# Patient Record
Sex: FEMALE | Race: WHITE
[De-identification: names, ages, dates, MRNs, and addresses within clinical notes are randomized per-mention and may not be internally consistent; named-entity substitution may affect disease eponyms.]

---

## 2019-02-27 ENCOUNTER — HOSPITAL ENCOUNTER (INPATIENT)
Dept: HOSPITAL 31 - C.EROB | Age: 29
LOS: 3 days | Discharge: HOME | End: 2019-03-02
Attending: OBSTETRICS & GYNECOLOGY | Admitting: OBSTETRICS & GYNECOLOGY
Payer: COMMERCIAL

## 2019-02-27 VITALS — BODY MASS INDEX: 25.9 KG/M2

## 2019-02-27 DIAGNOSIS — Z3A.39: ICD-10-CM

## 2019-02-27 LAB
ALBUMIN SERPL-MCNC: 3.8 G/DL (ref 3.5–5)
ALBUMIN/GLOB SERPL: 1.3 {RATIO} (ref 1–2.1)
ALT SERPL-CCNC: 20 U/L (ref 9–52)
AST SERPL-CCNC: 27 U/L (ref 14–36)
BASOPHILS # BLD AUTO: 0 K/UL (ref 0–0.2)
BASOPHILS NFR BLD: 0.4 % (ref 0–2)
BILIRUB UR-MCNC: NEGATIVE MG/DL
BUN SERPL-MCNC: 7 MG/DL (ref 7–17)
CALCIUM SERPL-MCNC: 9.3 MG/DL (ref 8.6–10.4)
EOSINOPHIL # BLD AUTO: 0.2 K/UL (ref 0–0.7)
EOSINOPHIL NFR BLD: 1.8 % (ref 0–4)
ERYTHROCYTE [DISTWIDTH] IN BLOOD BY AUTOMATED COUNT: 13.7 % (ref 11.5–14.5)
GFR NON-AFRICAN AMERICAN: > 60
GLUCOSE UR STRIP-MCNC: NORMAL MG/DL
HGB BLD-MCNC: 12.1 G/DL (ref 11–16)
LEUKOCYTE ESTERASE UR-ACNC: (no result) LEU/UL
LYMPHOCYTES # BLD AUTO: 1.4 K/UL (ref 1–4.3)
LYMPHOCYTES NFR BLD AUTO: 12.4 % (ref 20–40)
MCH RBC QN AUTO: 29.6 PG (ref 27–31)
MCHC RBC AUTO-ENTMCNC: 33 G/DL (ref 33–37)
MCV RBC AUTO: 89.7 FL (ref 81–99)
MONOCYTES # BLD: 0.9 K/UL (ref 0–0.8)
MONOCYTES NFR BLD: 7.4 % (ref 0–10)
NEUTROPHILS # BLD: 9 K/UL (ref 1.8–7)
NEUTROPHILS NFR BLD AUTO: 78 % (ref 50–75)
NRBC BLD AUTO-RTO: 0 % (ref 0–2)
PH UR STRIP: 6 [PH] (ref 5–8)
PLATELET # BLD: 269 K/UL (ref 130–400)
PMV BLD AUTO: 8.3 FL (ref 7.2–11.7)
PROT UR STRIP-MCNC: NEGATIVE MG/DL
RBC # BLD AUTO: 4.09 MIL/UL (ref 3.8–5.2)
RBC # UR STRIP: NEGATIVE /UL
SP GR UR STRIP: 1.02 (ref 1–1.03)
SQUAMOUS EPITHIAL: 1 /HPF (ref 0–5)
UROBILINOGEN UR-MCNC: NORMAL MG/DL (ref 0.2–1)
WBC # BLD AUTO: 11.6 K/UL (ref 4.8–10.8)

## 2019-02-27 NOTE — OBHP
===========================

Datetime: 2019 16:10

===========================

   

IP Adm Impression:  Term, intrauterine pregnancy; Ruptured Membranes

IP Admit Plan:  Admit to unit; Initiate labor protocol

Admit Comment, IP Provider:   at 39+weks vame with c/o srom at 2 pm and irr ctxs started ysterday
, no vb,+fm

   obhx pimi

   pmh de

   med pnv

   all nkda

   psh de

   soch de

      

   ssse =pooling+ntrazine

      

   a/p  at 39+ weeks prom/labor

   admit t l_d

   npo/ivf

   labs

   pitocin

   cont gisselle and efm

   anticipate 

Pelvic Type - PN:  Adequate

Extremities - PN:  Normal

Abdomen - PN:  Normal

Back - PN:  Normal

Breast - PN:  Normal

Lungs - PN:  Normal

Heart - PN:  Normal

Thyroid - PN:  Normal

Neurologic - PN:  Normal

HEENT - PN:  Normal

General - PN:  Normal

Amniotic Fluid Color, Provider:  Clear

Membranes, Provider:  Ruptured

Contraction Comments Provider:  q1-4

IP Prenatal Hx Assessment:  The Prenatal History has been Reviewed and is Current

EGA AdmitDate IP:  39.5

Vital Signs Provider:  Reviewed; Within Normal Limits

IP Chief Complaint:  Uterine contractions; Suspected ruptured membranes

Dilatation, Provider:  2

Effacement, Provider:  60

Station, Provider:  -2

Genitourinary Exam:  Normal

DTRs - PN:  Normal

## 2019-02-27 NOTE — OBADHP
===========================

Datetime: 2019 16:10

===========================

   

Admit Comment, IP Provider:   at 39+weks vame with c/o srom at 2 pm and irr ctxs started ysterday
, no vb,+fm

   obhx pimi

   pmh de

   med pnv

   all nkda

   psh de

   soch de

      

   ssse =pooling+ntrazine

      

   a/p  at 39+ weeks prom/labor

   admit t l_d

   npo/ivf

   labs

   pitocin

   cont gisselle and efm

   anticipate 

Pelvic Type - PN:  Adequate

Extremities - PN:  Normal

Abdomen - PN:  Normal

Back - PN:  Normal

Breast - PN:  Normal

Lungs - PN:  Normal

Heart - PN:  Normal

Thyroid - PN:  Normal

Neurologic - PN:  Normal

HEENT - PN:  Normal

General - PN:  Normal

Amniotic Fluid Color, Provider:  Clear

Membranes, Provider:  Ruptured

Contraction Comments Provider:  q1-4

IP Prenatal Hx Assessment:  The Prenatal History has been Reviewed and is Current

Vital Signs Provider:  Reviewed; Within Normal Limits

IP Chief Complaint:  Uterine contractions; Suspected ruptured membranes

Dilatation, Provider:  2

Effacement, Provider:  60

Station, Provider:  -2

Genitourinary Exam:  Normal

DTRs - PN:  Normal

EGA AdmitDate IP:  39.5

IP Adm Impression:  Term, intrauterine pregnancy; Ruptured Membranes

IP Admit Plan:  Admit to unit; Initiate labor protocol

## 2019-02-28 RX ADMIN — SIMETHICONE CHEW TAB 80 MG SCH: 80 TABLET ORAL at 18:08

## 2019-02-28 RX ADMIN — OXYCODONE HYDROCHLORIDE AND ACETAMINOPHEN PRN TAB: 5; 325 TABLET ORAL at 21:32

## 2019-02-28 RX ADMIN — SIMETHICONE CHEW TAB 80 MG SCH MG: 80 TABLET ORAL at 21:37

## 2019-02-28 NOTE — OBPN
===========================

Datetime: 2019 11:08

===========================

   

IP Progress Impression:  Arrest of dilatation/descent

IP Progress Plan:  Deliver- Section

IP Progress Note Comment:  Delayd etry:

   Pt admitted with PROM 2cm, tarted on pitoicn s/p epidral

   pt started tohave intemittent decelerations

   givne ressuctions, oxyge, pitocin dc

   pitocn was rstsrted after some time with decerlation, and dc adn restarted

   pt reexmained 2- 3cm

      

   pt counseld on Cat II with pitoicn and arrest at 2-3cm

   pt coulse don contiue iol vs ptlcs

   pt consented ofr cxs

   r/ba/i dw piaetn

   pt transfreed to or

   

===========================

Datetime: 2019 16:10

===========================

   

Membranes, Provider:  Ruptured

Amniotic Fluid Color, Provider:  Clear

Contraction Comments Provider:  q1-4

Vital Signs Provider:  Reviewed; Within Normal Limits

Dilatation, Provider:  2

Effacement, Provider:  60

Station, Provider:  -2

## 2019-02-28 NOTE — OBDS
===================================

DELIVERY PERSONNEL

===================================

   

Delivery Doctor:  Mary Ellen Daley MD

Scrub Nurse:  Jael Cano

Circulator:  LATHA Barfield

Anesthesiologist:  Dr Wray

   

===================================

MATERNAL INFORMATION

===================================

   

Delivery Anesthesia:  Epidural

Medications in Delivery:  pitocin

Estimated  Blood Loss (ml):  800

Placenta Cultured:  No

Maternal Complications:  None

Provider Comments:  ptlcs 

   nroal appearing ueurs tube sna dovaries b/l

   ebl 800l

   live female infnat

   cord x 3 tight

   pediatirican prsent for dleiveyr

   

===================================

LABOR SUMMARY

===================================

   

EDC:  2019 00:00

No. Babies in Womb:  0

 Attempted:  No

Labor Anesthesia:  Epidural

   

===================================

LABOR INFORMATION

===================================

   

Onset of Labor:  2019 14:00

Oxytocin:  Augmentation

Group B Beta Strep:  Negative

Steroids Given:  None

Reason Steroids Not Administered:  Not Applicable

   

===================================

MEMBRANES

===================================

   

Membranes Rupture Method:  Spontaneous

Rupture of Membranes:  2019 14:00

Length of Rupture (hrs):  20.43

Amniotic Fluid Color:  Clear

Amniotic Fluid Amount:  Moderate

   

===================================

STAGES OF LABOR

===================================

   

Stage 3 hrs:  0

Stage 3 min:  2

Total Time in Labor hrs:  20

Total Time in Labor min:  28

   

===================================

CSECTION DELIVERY

===================================

   

Primary Indication:  Other

Other Primary Indication:  Arrest dilatation

CSection Urgency:  Elective

CSection Incidence:  Primary

Labor:  Labor

Elective:  Elective

CSection Incision:  Lower Uterine Transverse

Sterilization Procedure:  [place]

   

===================================

BABY A INFORMATION

===================================

   

Infant Delivery Date/Time:  2019 10:26

Method of Delivery:  

Born in Route :  No

:  N/A

Forceps:  N/A

Vacuum Extraction:  N/A

Shoulder Dystocia :  No

   

===================================

SHOULDER DYSTOCIA BABY A

===================================

   

Infant Delivery Date/Time:  2019 10:26

   

===================================

PRESENTATION/POSITION BABY A

===================================

   

Presentation:  Cephalic

Cephalic Presentation:  Vertex

Vertex Position:  Right Occipital Transverse

Breech Presentation:  N/A

   

===================================

PLACENTA INFORMATION BABY A

===================================

   

Placenta Delivery Time :  2019 10:28

Placenta Method of Delivery:  Manual Removal

Placenta Status:  Delivered

   

===================================

APGAR SCORES BABY A

===================================

   

Heart Rate 1 min:  >100 bpm

Resp Effort 1 min:  Good Cry

Reflex Irritability 1 min:  Cough or Sneeze or Pulls Away

Muscle Tone 1 min:  Active Motion

Color 1 min:  Body Pink, Extremities Blue

Resuscitation Effort 1 min:  Tactile Stimulation

APGAR SCORE 1 MIN:  9

Heart Rate 5 min:  >100 bpm

Resp Effort 5 min:  Good Cry

Reflex Irritability 5 min:  Cough or Sneeze or Pulls Away

Muscle Tone 5 min:  Active Motion

Color 5 min:  Body Pink, Extremities Blue

Resuscitation Effort 5 min:  Tactile Stimulation

APGAR SCORE 5 MIN:  9

   

===================================

INFANT INFORMATION BABY A

===================================

   

Gestational Age at Delivery:  39.5

Gestational Status:  Term

Infant Outcome :  Liveborn

Infant Condition :  Stable

Infant Sex:  Female

   

===================================

IDENTIFICATION/MEDS BABY A

===================================

   

ID Band Number:  [id number]

ID Band Location:  Left Leg; Left Arm



Sensor Applied:  Yes

Sensor Number:  [id number]

Sensor Location :  Cord Clamp

Vitamin K Given :  Not Given

Erythromycin Given:  Not Given

   

===================================

WEIGHT/LENGTH BABY A

===================================

   

Infant Birthweight (gms):  3520

Infant Weight (lb):  7

Infant Weight (oz):  12

Infant Length Inches:  20.00

Infant Length cms:  50.8

   

===================================

CORD INFORMATION BABY A

===================================

   

No. Cord Vessels:  3

Nuchal Cord :  x3 tight

Infant Cord pH Baby Venous:  7.34

Cord Blood Taken:  Yes

Infant Suction:  Mouth; Nose

   

===================================

ASSESSMENT BABY A

===================================

   

Infant Complications:  None

Physical Findings at Delivery:  Within Normal Limits

Infant Respirations:  Appears Normal

Infant Care By:  Dr Sin

Transferred To:  Remains with Mother

## 2019-03-01 LAB
ERYTHROCYTE [DISTWIDTH] IN BLOOD BY AUTOMATED COUNT: 14 % (ref 11.5–14.5)
HGB BLD-MCNC: 12 G/DL (ref 11–16)
MCH RBC QN AUTO: 30.4 PG (ref 27–31)
MCHC RBC AUTO-ENTMCNC: 33 G/DL (ref 33–37)
MCV RBC AUTO: 91.9 FL (ref 81–99)
PLATELET # BLD: 236 K/UL (ref 130–400)
PMV BLD AUTO: 8.2 FL (ref 7.2–11.7)
RBC # BLD AUTO: 3.97 MIL/UL (ref 3.8–5.2)
WBC # BLD AUTO: 18.4 K/UL (ref 4.8–10.8)

## 2019-03-01 RX ADMIN — SIMETHICONE CHEW TAB 80 MG SCH MG: 80 TABLET ORAL at 14:53

## 2019-03-01 RX ADMIN — SIMETHICONE CHEW TAB 80 MG SCH: 80 TABLET ORAL at 21:19

## 2019-03-01 RX ADMIN — OXYCODONE HYDROCHLORIDE AND ACETAMINOPHEN PRN TAB: 5; 325 TABLET ORAL at 18:07

## 2019-03-01 RX ADMIN — SIMETHICONE CHEW TAB 80 MG SCH MG: 80 TABLET ORAL at 18:09

## 2019-03-01 RX ADMIN — SIMETHICONE CHEW TAB 80 MG SCH MG: 80 TABLET ORAL at 09:12

## 2019-03-01 RX ADMIN — OXYCODONE HYDROCHLORIDE AND ACETAMINOPHEN PRN TAB: 5; 325 TABLET ORAL at 07:46

## 2019-03-01 RX ADMIN — SIMETHICONE CHEW TAB 80 MG SCH MG: 80 TABLET ORAL at 14:48

## 2019-03-01 RX ADMIN — PRENATAL VIT W/ FE FUMARATE-FA TAB 27-0.8 MG SCH TAB: 27-0.8 TAB at 10:00

## 2019-03-01 NOTE — OP
PROCEDURE DATE:  2019



PREOPERATIVE DIAGNOSIS:  Arrest of dilation.



POSTOPERATIVE DIAGNOSIS:  Arrest of dilation.



PROCEDURE PERFORMED:  Primary low-transverse  section.



SURGEON:  Mary Ellen Daley MD



ASSISTANT:  Danny Holley MD



ANESTHESIA:  Epidural spinal.



OPERATIVE FINDINGS:  Live female infant.  Tight nuchal cord x3. 

Normal-appearing uterus, tubes, and ovaries.  Apgars 9 and 9.  Pediatrician

was present for delivery.



Dr. Danny Holley was the surgical assistant, present for the entire case,

especially in gaining entry, retraction, exposure, holding the bladder

blade, helping in delivery of the baby, closing all layers.



ESTIMATED BLOOD LOSS:  800 mL.



BLOOD PRODUCTS:  None.



COMPLICATIONS:  None.



PATHOLOGY:  Placenta.



DESCRIPTION OF THE PROCEDURE:  The patient was taken to the operating room

where she was given anesthesia.  Once found to be adequate, she was placed

on the operating table in dorsal supine position with legs supported using

stirrups.  The patient was then prepped and draped in the usual sterile

fashion.  A time-out confirmed correct patient and correct procedure.  A

Pfannenstiel skin incision was made with a scalpel and carried down to the

underlying fascia.  The fascia was incised in the midline.  The incision

was extended laterally with the Bovie.  The inferior aspect of the fascial

incision was grasped with Allis and Kocher clamps, and the underlying

rectus muscles were dissected off bluntly.  The rectus muscles were then

 in the midline.  The peritoneum was identified and entered into

clear space.  The incision was extended laterally and superiorly until

there was good visualization of the bladder.  The lower end of the Dexter

was then reinserted.  Lower uterine segment was incised in a transverse

fashion.  The uterine incision was extended laterally with bandage

scissors.  The surgeon's hand entered the uterine cavity.  The infant's

head was delivered atraumatically.  There was tight nuchal x3 that was

reduced, followed by delivery of shoulders, followed by delivery of body,

both oral and nasal passages of the baby were bulb suctioned.  The

umbilical cord was clamped and cut.  Baby was handed off to the awaiting

pediatrician.  Cord blood and cord gases were collected and sent x2.  The

placenta was then delivered manually.  The uterus was exteriorized and

cleared of all clots and debris.  The uterine incision was closed with 0

Vicryl in a running continuous locked fashion.  A second layer of the same

suture was used to close the uterus in running imbricating manner.  The

uterus was then returned to the abdomen.  Pericolic gutters were cleared of

all clots and debris.  The peritoneum was reapproximated and closed with a

2-0 chromic in a running continuous fashion, and the rectus was

reapproximated with a 2-0 chromic in an interrupted manner.  The fascia was

reapproximated with 0 Vicryl in a running continuous fashion.  The

subcutaneous space was closed with a 2-0 plain in an interrupted manner. 

The skin was reapproximated with 4-0 Monocryl in a running subcuticular

fashion.  At the end of the procedure, all needle, sponge and instrument

counts were noted to be correct x2.  The patient tolerated the procedure

well and was transferred to the recovery room in stable condition.







__________________________________________

Mary Ellen Daley MD





DD:  2019 13:38:23

DT:  2019 19:14:19

Job # [id number]

## 2019-03-01 NOTE — OBPPN
===========================

Datetime: 03/01/2019 10:48

===========================

   

PP Pain Prov:  Within normal limits

PP Nausea Prov:  Denies

PP Flatus Prov:  Yes

PP BM Prov:  No

PP Breasts Prov:  Normal

PP Heart Prov:  Normal

PP Lungs Prov:  Normal

PP Abdomen/Uterus Prov:  Normal

PP Lochia Prov:  Normal

PP Vulva/Perineum Prov:  Normal

PP CVA Tenderness Prov:  Normal

PP Extremities Prov:  Normal

PP C/S Incision Prov:  Normal

PP Breastfeeding Progress Prov:  Normal

PP Comments Phys Exam Prov:  GEN NAD AAO x 3

   RESP: CTAB?l

   CVS: RRR< +S1/S2

   BREAST: breat feeidgn non engoargel

   ABD: soft, NT/ND, _BS

   IUNCISOCN C?D?I

   FUNDUS: FImr, at [facility], no uteirne tndner

   minianl lochai non cousl smeling

   EX:T no calf tendner b/l

PP Impression Prov:  Normal postpartum progression

PP Progress Note Prov:  pt seen adn examiend pain contolled with meds. passign gas, no fever, chils, 
n/v

   +breastfeeidng

   VSS

   PE see aove

      

   a/p s/p PTLCS POD #1 with leukocyt

   pain magnet

   repat am cbc

   encoaruge breats feeding and dmabiotn

   abdomanil bidner

   incsentive sprionmer

Vital Signs Provider PP:  Reviewed; Within Normal Limits

## 2019-03-02 VITALS
DIASTOLIC BLOOD PRESSURE: 68 MMHG | TEMPERATURE: 97.9 F | HEART RATE: 87 BPM | SYSTOLIC BLOOD PRESSURE: 112 MMHG | RESPIRATION RATE: 18 BRPM | OXYGEN SATURATION: 97 %

## 2019-03-02 LAB
BASOPHILS # BLD AUTO: 0 K/UL (ref 0–0.2)
BASOPHILS NFR BLD: 0.2 % (ref 0–2)
BILIRUB UR-MCNC: NEGATIVE MG/DL
EOSINOPHIL # BLD AUTO: 0.4 K/UL (ref 0–0.7)
EOSINOPHIL NFR BLD: 3 % (ref 0–4)
ERYTHROCYTE [DISTWIDTH] IN BLOOD BY AUTOMATED COUNT: 14 % (ref 11.5–14.5)
GLUCOSE UR STRIP-MCNC: NORMAL MG/DL
HGB BLD-MCNC: 10.4 G/DL (ref 11–16)
LEUKOCYTE ESTERASE UR-ACNC: (no result) LEU/UL
LYMPHOCYTES # BLD AUTO: 1.8 K/UL (ref 1–4.3)
LYMPHOCYTES NFR BLD AUTO: 12.7 % (ref 20–40)
MCH RBC QN AUTO: 30.2 PG (ref 27–31)
MCHC RBC AUTO-ENTMCNC: 32.8 G/DL (ref 33–37)
MCV RBC AUTO: 92.1 FL (ref 81–99)
MONOCYTES # BLD: 1.1 K/UL (ref 0–0.8)
MONOCYTES NFR BLD: 7.7 % (ref 0–10)
NEUTROPHILS # BLD: 10.9 K/UL (ref 1.8–7)
NEUTROPHILS NFR BLD AUTO: 76.4 % (ref 50–75)
NRBC BLD AUTO-RTO: 0 % (ref 0–2)
PH UR STRIP: 7 [PH] (ref 5–8)
PLATELET # BLD: 214 K/UL (ref 130–400)
PMV BLD AUTO: 8.3 FL (ref 7.2–11.7)
PROT UR STRIP-MCNC: NEGATIVE MG/DL
RBC # BLD AUTO: 3.44 MIL/UL (ref 3.8–5.2)
RBC # UR STRIP: (no result) /UL
SP GR UR STRIP: 1.01 (ref 1–1.03)
SQUAMOUS EPITHIAL: 2 /HPF (ref 0–5)
UROBILINOGEN UR-MCNC: NORMAL MG/DL (ref 0.2–1)
WBC # BLD AUTO: 14.3 K/UL (ref 4.8–10.8)

## 2019-03-02 RX ADMIN — OXYCODONE HYDROCHLORIDE AND ACETAMINOPHEN PRN TAB: 5; 325 TABLET ORAL at 06:48

## 2019-03-02 RX ADMIN — BACITRACIN SCH GM: 500 OINTMENT TOPICAL at 14:27

## 2019-03-02 RX ADMIN — PRENATAL VIT W/ FE FUMARATE-FA TAB 27-0.8 MG SCH TAB: 27-0.8 TAB at 09:38

## 2019-03-02 RX ADMIN — SIMETHICONE CHEW TAB 80 MG SCH MG: 80 TABLET ORAL at 14:25

## 2019-03-02 RX ADMIN — SIMETHICONE CHEW TAB 80 MG SCH MG: 80 TABLET ORAL at 09:38

## 2019-03-02 RX ADMIN — BACITRACIN SCH GM: 500 OINTMENT TOPICAL at 11:15

## 2019-03-02 RX ADMIN — OXYCODONE HYDROCHLORIDE AND ACETAMINOPHEN PRN TAB: 5; 325 TABLET ORAL at 02:31

## 2019-03-02 NOTE — OBDCSUM
===========================

Datetime: 03/02/2019 10:43

===========================

   

Discharged to, Provider:  Home

Follow up at, Provider:  [facility] Instr Activity:  Normal activity

Disch Instr Diet:  Regular

Discharge Instructions, Provider:  Routine instructions given

Discharge Diagnosis, Provider:  Term Pregnancy Delivered

Discharge Time:  03/02/2019 16:10

Follow up in weeks, Provider:  03-11-19

Disch Referrals:  None

Contraception discussed, Prov:  Yes

Disch Activity Restrictions:  No lifting; Minimize stair-climbing; No sexual activity; Nothing in vag
holly - [place], tampons, douche

Contraception after Delivery:  Not Planning to Use

## 2019-03-02 NOTE — OBPPN
===========================

Datetime: 03/02/2019 16:43

===========================

   

PP Pain Prov:  Within normal limits

PP Nausea Prov:  Denies

PP Flatus Prov:  Yes

PP Breasts Prov:  Normal

PP Heart Prov:  Normal

PP Lungs Prov:  Normal

PP Abdomen/Uterus Prov:  Normal

PP Lochia Prov:  Normal

PP Vulva/Perineum Prov:  Normal

PP CVA Tenderness Prov:  Normal

PP Extremities Prov:  Normal

PP Impression Prov:  Normal postpartum progression

PP Plan Prov:  Discharge

PP Progress Note Prov:  pt doign well

   vss

   pe above

   a/p s/p PTC SPOD #2 doing well

   wbc improved

    francisco garcia

   dc home

   rto office 1 week

Vital Signs Provider PP:  Reviewed; Within Normal Limits